# Patient Record
Sex: FEMALE | Employment: PART TIME | URBAN - METROPOLITAN AREA
[De-identification: names, ages, dates, MRNs, and addresses within clinical notes are randomized per-mention and may not be internally consistent; named-entity substitution may affect disease eponyms.]

---

## 2017-12-05 ENCOUNTER — OFFICE VISIT (OUTPATIENT)
Dept: OBGYN CLINIC | Age: 22
End: 2017-12-05
Payer: COMMERCIAL

## 2017-12-05 ENCOUNTER — HOSPITAL ENCOUNTER (OUTPATIENT)
Age: 22
Setting detail: SPECIMEN
Discharge: HOME | End: 2017-12-05
Payer: COMMERCIAL

## 2017-12-05 VITALS
HEIGHT: 63 IN | DIASTOLIC BLOOD PRESSURE: 80 MMHG | WEIGHT: 180 LBS | SYSTOLIC BLOOD PRESSURE: 118 MMHG | BODY MASS INDEX: 31.89 KG/M2

## 2017-12-05 DIAGNOSIS — Z01.419 ENCOUNTER FOR GYNECOLOGICAL EXAMINATION WITHOUT ABNORMAL FINDING: Primary | ICD-10-CM

## 2017-12-05 DIAGNOSIS — N89.8 VAGINAL DISCHARGE: ICD-10-CM

## 2017-12-05 PROCEDURE — 99385 PREV VISIT NEW AGE 18-39: CPT | Performed by: OBSTETRICS & GYNECOLOGY

## 2017-12-05 RX ORDER — ARIPIPRAZOLE 5 MG/1
TABLET ORAL
COMMUNITY
Start: 2017-09-22

## 2017-12-05 RX ORDER — METHYLPHENIDATE HYDROCHLORIDE 40 MG/1
40 CAPSULE, EXTENDED RELEASE ORAL
COMMUNITY
Start: 2017-12-02

## 2017-12-05 RX ORDER — NORGESTREL AND ETHINYL ESTRADIOL 0.3-0.03MG
KIT ORAL
COMMUNITY
Start: 2017-11-18

## 2017-12-05 RX ORDER — LAMOTRIGINE 25 MG/1
TABLET ORAL
COMMUNITY
Start: 2017-10-24

## 2017-12-05 RX ORDER — ESCITALOPRAM OXALATE 10 MG/1
TABLET ORAL
COMMUNITY
Start: 2017-12-02

## 2017-12-05 RX ORDER — LAMOTRIGINE 200 MG/1
200 TABLET ORAL
COMMUNITY
Start: 2017-11-17

## 2017-12-05 NOTE — PROGRESS NOTES
Allergies:  No Known Allergies    Gynecologic History:  Patient's last menstrual period was 2017 (approximate). Sexually Active: Yes  STD History: No  Abnormal Pap History no  Birth Control: Yes , OCPs    Obstetric History       T0      L0     SAB0   TAB0   Ectopic0   Molar0   Multiple0   Live Births0      ______________________________________________________________________  REVIEW OF SYSTEMS:        Constitutional:  Unexpected weight change no  Neurological:  Frequent headaches  no  Ophthalmic:  Recent visual changes no  ENT:   Difficulty swallowing  yes  Breast:              Masses   no     Respiratory:  Shortness of breath  no    Cardiovascular: Chest pain   no     Gastrointestinal: Chronic diarrhea/constipation yes   Urogenital:  Urinary incontinence  no                                         Heavy/irregular periods           no                                      Vaginal discharge                   no  Hematological: Bruises easy   no     Endocrine:  Nipple Discharge  no     Hot/Cold Intolerance  no   Psychological:  Mood and affect were wnl yes                                                                                                                                           Physical Exam:    Vitals:    17 1311   BP: 118/80     [unfilled]  Body mass index is 31.89 kg/m². General Appearance:  She does not appear to be in any distress. This  is a well developed, well nourished, well groomed female. Neurological:  The patient is alert and oriented to time, place, person, and situation without any noted sensory motor deficits. Skin:  A brief inspection of the skin revealed no rashes or lesions. Neck:  The neck was supple. There is no tracheal deviation, thyromegaly or supraclavicular adenopathy appreciated. Breast:   The patients breasts were symmetrical.  Breasts are nontender and there  were no masses, discharge or pathologic skin changes.    There is

## 2017-12-06 LAB
C TRACH DNA GENITAL QL NAA+PROBE: NEGATIVE
DIRECT EXAM: ABNORMAL
Lab: ABNORMAL
N. GONORRHOEAE DNA: NEGATIVE
SPECIMEN DESCRIPTION: ABNORMAL
STATUS: ABNORMAL

## 2017-12-07 RX ORDER — METRONIDAZOLE 500 MG/1
500 TABLET ORAL 2 TIMES DAILY
Qty: 14 TABLET | Refills: 0 | Status: SHIPPED | OUTPATIENT
Start: 2017-12-07 | End: 2017-12-14

## 2017-12-12 LAB — CYTOLOGY REPORT: NORMAL

## 2019-01-10 ENCOUNTER — TELEPHONE (OUTPATIENT)
Dept: OBGYN CLINIC | Age: 24
End: 2019-01-10

## 2021-06-30 ENCOUNTER — APPOINTMENT (OUTPATIENT)
Dept: CT IMAGING | Facility: CLINIC | Age: 26
End: 2021-06-30
Payer: COMMERCIAL

## 2021-06-30 ENCOUNTER — HOSPITAL ENCOUNTER (EMERGENCY)
Facility: CLINIC | Age: 26
Discharge: HOME | End: 2021-06-30
Attending: EMERGENCY MEDICINE
Payer: COMMERCIAL

## 2021-06-30 VITALS
WEIGHT: 220 LBS | TEMPERATURE: 98.5 F | HEART RATE: 106 BPM | SYSTOLIC BLOOD PRESSURE: 151 MMHG | HEIGHT: 61 IN | DIASTOLIC BLOOD PRESSURE: 110 MMHG | BODY MASS INDEX: 41.54 KG/M2 | OXYGEN SATURATION: 96 % | RESPIRATION RATE: 16 BRPM

## 2021-06-30 DIAGNOSIS — S09.90XA INJURY OF HEAD, INITIAL ENCOUNTER: Primary | ICD-10-CM

## 2021-06-30 PROCEDURE — 99283 EMERGENCY DEPT VISIT LOW MDM: CPT

## 2021-06-30 PROCEDURE — 70450 CT HEAD/BRAIN W/O DYE: CPT

## 2021-06-30 RX ORDER — BUPROPION HYDROCHLORIDE 300 MG/1
300 TABLET ORAL EVERY MORNING
COMMUNITY

## 2021-06-30 RX ORDER — BUSPIRONE HYDROCHLORIDE 15 MG/1
15 TABLET ORAL 2 TIMES DAILY
COMMUNITY

## 2021-06-30 RX ORDER — ONDANSETRON 4 MG/1
4 TABLET, FILM COATED ORAL EVERY 8 HOURS PRN
Qty: 20 TABLET | Refills: 0 | Status: SHIPPED | OUTPATIENT
Start: 2021-06-30

## 2021-06-30 ASSESSMENT — PAIN DESCRIPTION - PROGRESSION: CLINICAL_PROGRESSION: NOT CHANGED

## 2021-06-30 ASSESSMENT — PAIN SCALES - GENERAL: PAINLEVEL_OUTOF10: 6

## 2021-06-30 ASSESSMENT — PAIN DESCRIPTION - ORIENTATION: ORIENTATION: LEFT;ANTERIOR;UPPER

## 2021-06-30 ASSESSMENT — PAIN - FUNCTIONAL ASSESSMENT: PAIN_FUNCTIONAL_ASSESSMENT: ACTIVITIES ARE NOT PREVENTED

## 2021-06-30 ASSESSMENT — PAIN DESCRIPTION - PAIN TYPE: TYPE: ACUTE PAIN

## 2021-06-30 ASSESSMENT — PAIN DESCRIPTION - DESCRIPTORS: DESCRIPTORS: ACHING

## 2021-06-30 ASSESSMENT — PAIN DESCRIPTION - LOCATION: LOCATION: HEAD

## 2021-06-30 ASSESSMENT — PAIN DESCRIPTION - ONSET: ONSET: SUDDEN

## 2021-06-30 NOTE — ED PROVIDER NOTES
[facility]  [address] [facility]  Phone: [phone] [facility],[address]      Pt Name: Christina Chang  MRN: [id number]  [place] 1995  Date of evaluation: 6/30/2021    CHIEF COMPLAINT       Chief Complaint   Patient presents with    Head Injury     [facility]; no LOC, +vomiting       HISTORY OF PRESENT ILLNESS    Christina Chang is a 26 y.o. female who presents to the emergency department planing of a head injury that occurred around 330 when a pressure gauge fell on her head. No loss of consciousness no neck pain. Sent from urgent care to rule out brain bleed. She admits to feeling tired since the incident and randomly breaks out in tears. She is complaining of 6 out of 10 head pain. Denies any other complaints. No paresthesias or weakness. She states that the doctor at the urgent care was worried about her eyes moving back and forth quickly. REVIEW OF SYSTEMS       Constitutional: No fevers or chills   HEENT: No sore throat, rhinorrhea, or earache   Eyes: No blurry vision or double vision no drainage   Cardiovascular: No chest pain or tachycardia   Respiratory: No wheezing or shortness of breath no cough   Gastrointestinal: Positive nausea vomiting no diarrhea, constipation, or abdominal pain   : No hematuria or dysuria   Musculoskeletal: No swelling or pain   Skin: No rash   Neurological: No focal neurologic complaints, paresthesias, weakness, positive headache     PAST MEDICAL HISTORY    has a past medical history of Bipolar disorder ([address] and Depression. SURGICAL HISTORY      has a past surgical history that includes [place] tooth extraction.     CURRENT MEDICATIONS       Previous Medications    ARIPIPRAZOLE (ABILIFY) 5 MG TABLET        BUPROPION (WELLBUTRIN XL) 300 MG EXTENDED RELEASE TABLET    Take 300 mg by mouth every morning    BUSPIRONE (BUSPAR) 15 MG TABLET    Take 15 mg by mouth 2 times daily 1.5 tablets twice daily    CHOLECALCIFEROL (VITAMIN D3 GUMMIES PO)    Take by mouth    ESCITALOPRAM (LEXAPRO) 10 MG TABLET        FLUTICASONE PROPIONATE (FLONASE ALLERGY RELIEF NA)    by Nasal route    LAMOTRIGINE (LAMICTAL) 200 MG TABLET    200 mg     LAMOTRIGINE (LAMICTAL) 25 MG TABLET        LOW-OGESTREL 0.3-30 MG-MCG PER TABLET        METHYLPHENIDATE (RITALIN LA) 40 MG EXTENDED RELEASE CAPSULE    40 mg. 40 mg morning; 20 mg night    MULTIPLE VITAMINS-MINERALS (WOMENS MULTIVITAMIN PO)    Take by mouth    PROAIR  (90 BASE) MCG/ACT INHALER           ALLERGIES     is allergic to codeine and neosporin plus max st.    FAMILY HISTORY     She indicated that her other is . family history includes Breast Cancer in an other family member. SOCIAL HISTORY      reports that she has never smoked. She has never used smokeless tobacco. She reports current alcohol use. PHYSICAL EXAM       ED Triage Vitals [21 1744]   BP Temp Temp Source Pulse Resp SpO2 Height Weight   (!) 151/110 98.5 °F (36.9 °C) Oral 106 16 96 % 5' 1\" (1.549 m) 220 lb (99.8 kg)       Constitutional: Alert, oriented x3, nontoxic, answering questions appropriately, acting properly for age, in no acute distress   HEENT: Extraocular muscles intact, mucus membranes moist, TMs clear bilaterally, no posterior pharyngeal erythema or exudates, Pupils equal, round, reactive to light, no hemotympanum. No nystagmus currently. Pupils dilated and reactive  Neck: Trachea midline no posterior midline neck tenderness palpation  Cardiovascular: Regular rhythm and rate no S3, S4, or murmurs   Respiratory: Clear to auscultation bilaterally no wheezes, rhonchi, rales, no respiratory distress no tachypnea no retractions no hypoxia  Gastrointestinal: Soft, nontender, nondistended, positive bowel sounds. No rebound, rigidity, or guarding.    Musculoskeletal: No extremity pain or swelling   Neurologic: Moving all 4 extremities without difficulty there are no gross focal neurologic deficits  strength is 5/5 bilaterally. Finger-to-nose and heel-to-shin normal.  Skin: Warm and dry       DIFFERENTIAL DIAGNOSIS/ MDM:     No gross focal neurologic deficits. CT head. DIAGNOSTIC RESULTS     EKG: All EKG's are interpreted by the Emergency Department Physician who either signs or Co-signs this chart in the absence of a cardiologist.        Not indicated unless otherwise documented above    LABS:  No results found for this visit on 06/30/21. Not indicated unless otherwise documented above    RADIOLOGY:   I reviewed the radiologist interpretations:    CT HEAD WO CONTRAST   Final Result   No acute intracranial abnormality. Not indicated unless otherwise documented above    EMERGENCY DEPARTMENT COURSE:     The patient was given the following medications:  Orders Placed This Encounter   Medications    ondansetron (ZOFRAN) 4 MG tablet     Sig: Take 1 tablet by mouth every 8 hours as needed for Nausea     Dispense:  20 tablet     Refill:  0        Vitals:   -------------------------  BP (!) 151/110   Pulse 106   Temp 98.5 °F (36.9 °C) (Oral)   Resp 16   Ht 5' 1\" (1.549 m)   Wt 99.8 kg (220 lb)   LMP 06/30/2021   SpO2 96%   BMI 41.57 kg/m²     6:25 PM CT the head unremarkable. Supportive care. Increase fluids as tolerated prescription for Zofran. Follow-up with occupational medicine for reevaluation and return if worsening symptoms or any other concerns. No gross focal neurologic deficits. The patient understands that at this time there is no evidence for a more malignant underlying process, but also understands that early in the process of an illness or injury, an emergency department workup can be falsely reassuring. Routine discharge counseling was given, and it is understood that worsening, changing or persistent symptoms should prompt an immediate call or follow up with their primary physician or return to the emergency department. The importance of appropriate follow up was also discussed. I have reviewed the disposition diagnosis. I have answered the questions and given discharge instructions. There was voiced understanding of these instructions and no further questions or complaints. CRITICAL CARE:    None    CONSULTS:    None    PROCEDURES:    None      OARRS Report if indicated             FINAL IMPRESSION      1.  Injury of head, initial encounter          DISPOSITION/PLAN   DISPOSITION Decision To Discharge 06/30/2021 06:24:19 PM        CONDITION ON DISPOSITION: STABLE       PATIENT REFERRED TO:  Occupational medicine    Schedule an appointment as soon as possible for a visit in 3 days        DISCHARGE MEDICATIONS:  New Prescriptions    ONDANSETRON (ZOFRAN) 4 MG TABLET    Take 1 tablet by mouth every 8 hours as needed for Nausea       (Please note that portions of this note were completed with a voice recognition program.  Efforts were made to edit the dictations but occasionally words are mis-transcribed.)    Sharyn Baer DO   Attending Emergency Physician      Sharyn Baer DO  06/30/21 [id number]